# Patient Record
Sex: MALE | Race: WHITE | NOT HISPANIC OR LATINO | URBAN - METROPOLITAN AREA
[De-identification: names, ages, dates, MRNs, and addresses within clinical notes are randomized per-mention and may not be internally consistent; named-entity substitution may affect disease eponyms.]

---

## 2018-02-01 ENCOUNTER — EMERGENCY (EMERGENCY)
Facility: HOSPITAL | Age: 83
LOS: 1 days | Discharge: HOME | End: 2018-02-01
Attending: EMERGENCY MEDICINE | Admitting: EMERGENCY MEDICINE
Payer: MEDICARE

## 2018-02-01 VITALS
RESPIRATION RATE: 64 BRPM | DIASTOLIC BLOOD PRESSURE: 76 MMHG | SYSTOLIC BLOOD PRESSURE: 144 MMHG | TEMPERATURE: 98 F | WEIGHT: 115.08 LBS | HEART RATE: 64 BPM | OXYGEN SATURATION: 98 %

## 2018-02-01 DIAGNOSIS — M71.162 OTHER INFECTIVE BURSITIS, LEFT KNEE: ICD-10-CM

## 2018-02-01 DIAGNOSIS — Z79.899 OTHER LONG TERM (CURRENT) DRUG THERAPY: ICD-10-CM

## 2018-02-01 DIAGNOSIS — I10 ESSENTIAL (PRIMARY) HYPERTENSION: ICD-10-CM

## 2018-02-01 DIAGNOSIS — M25.562 PAIN IN LEFT KNEE: ICD-10-CM

## 2018-02-01 DIAGNOSIS — E78.5 HYPERLIPIDEMIA, UNSPECIFIED: ICD-10-CM

## 2018-02-01 DIAGNOSIS — Z79.82 LONG TERM (CURRENT) USE OF ASPIRIN: ICD-10-CM

## 2018-02-01 DIAGNOSIS — I25.10 ATHEROSCLEROTIC HEART DISEASE OF NATIVE CORONARY ARTERY WITHOUT ANGINA PECTORIS: ICD-10-CM

## 2018-02-01 PROCEDURE — 73562 X-RAY EXAM OF KNEE 3: CPT | Mod: 26

## 2018-02-01 PROCEDURE — 93971 EXTREMITY STUDY: CPT

## 2018-02-01 PROCEDURE — 99284 EMERGENCY DEPT VISIT MOD MDM: CPT | Mod: 25

## 2018-02-01 PROCEDURE — 93971 EXTREMITY STUDY: CPT | Mod: 26,LT

## 2018-02-01 PROCEDURE — 73562 X-RAY EXAM OF KNEE 3: CPT | Mod: 26,LT

## 2018-02-01 PROCEDURE — 73562 X-RAY EXAM OF KNEE 3: CPT

## 2018-02-01 RX ORDER — SIMVASTATIN 20 MG/1
0 TABLET, FILM COATED ORAL
Qty: 0 | Refills: 0 | COMMUNITY

## 2018-02-01 RX ORDER — ASPIRIN/CALCIUM CARB/MAGNESIUM 324 MG
1 TABLET ORAL
Qty: 0 | Refills: 0 | COMMUNITY

## 2018-02-01 RX ORDER — CARVEDILOL PHOSPHATE 80 MG/1
0 CAPSULE, EXTENDED RELEASE ORAL
Qty: 0 | Refills: 0 | COMMUNITY

## 2018-02-01 RX ORDER — DICLOXACILLIN SODIUM 500 MG/1
1 CAPSULE ORAL
Qty: 56 | Refills: 0 | OUTPATIENT
Start: 2018-02-01 | End: 2018-02-14

## 2018-02-01 RX ORDER — CLOPIDOGREL BISULFATE 75 MG/1
1 TABLET, FILM COATED ORAL
Qty: 0 | Refills: 0 | COMMUNITY

## 2018-02-01 RX ORDER — LISINOPRIL 2.5 MG/1
0 TABLET ORAL
Qty: 0 | Refills: 0 | COMMUNITY

## 2018-02-01 NOTE — ED PROVIDER NOTE - DIAGNOSTIC INTERPRETATION
ER Physician: Loraine Director  INTERPRETATION:  no acute fracture; no soft tissue swelling noted; normal bony alignment.

## 2018-02-01 NOTE — ED PROVIDER NOTE - OBJECTIVE STATEMENT
81 yo M w/ PMHx CAD s/p 2 stents (2002), HTN presents for worsening L knee swelling x 4 days.  Pt reports mechanical fall 9 days ago, landed on L knee.  No head trauma, no LOC.  For the first 5 days, pt had no symptoms and was able to ambulate without difficulty.  Since Monday, he has intermittent L knee pain and swelling, the swelling has progressed to the point where ambulation is now difficult.  Pt unable to fully bend knee.  No abrasion or cut over L knee associated w/ fall.  Pt called his orthopedist, Dr. Davis, and scheduled an appt for 2/6/18; however, his symptoms were bothersome and he decided to come to the ED today. Pt denies popping sensation in his knee; on ASA and plavix, denies use of blood thinners. ROS negative for fevers, chills, nausea, vomiting, cough, CP, abdominal pain, hematuria, melena, hematochezia, calf tenderness. 81 yo M w/ PMHx CAD s/p 2 stents (2002), HTN presents for worsening L knee swelling x 4 days.  Pt reports mechanical fall 9 days ago, landed on L knee.  No head trauma, no LOC.  For the first 5 days, pt had no symptoms and was able to ambulate without difficulty.  Since Monday, he has intermittent L knee pain and swelling, the swelling has progressed to the point where ambulation is now difficult.  Pt unable to fully bend knee.  No abrasion or cut over L knee associated w/ fall.  Pt called his orthopedist, Dr. Davis, and scheduled an appt for 2/6/18; however, his symptoms were bothersome and he decided to come to the ED today. Pt denies popping sensation in his knee; on ASA and plavix, denies use of blood thinners. ROS negative for bleeding gums, easy bruising, fevers, chills, nausea, vomiting, cough, CP, abdominal pain, hematuria, melena, hematochezia, calf tenderness.

## 2018-02-01 NOTE — ED ADULT NURSE NOTE - OBJECTIVE STATEMENT
Pt states that he tripped and fell a week ago and landed on L knee. Pt states that 3 days ago he noticed swelling and redness to L knee. Pt able to ambulate without assistance

## 2018-02-01 NOTE — ED PROVIDER NOTE - MEDICAL DECISION MAKING DETAILS
81 yo M w/ PMHx CAD s/p 2 stents (2002), HTN presents for worsening L knee swelling x 4 days after mechanical fall on L knee 9 days ago.  Low suspicion for septic joint as pt afebrile.  PE w/ erythema overlying knee likely 2/2 significant swelling.  No fluctuance, no warmth.  Will obtain XR to r/o fx. 81 yo M w/ PMHx CAD s/p 2 stents (2002), HTN presents for worsening L knee swelling x 4 days after mechanical fall on L knee 9 days ago.  Low suspicion for septic joint as pt afebrile.  PE w/ erythema overlying knee likely 2/2 significant swelling.  No fluctuance, no warmth.  XR negative for fracture.  US negative for DVT.  Pt asked to take colchicine x 1 (pt already has) for possible gout.  Will also give dicloxacillin 500 mg four times day x 14 days for suspected patellar bursitis.  Pt asked to f/u w/ existing outpt orthopedist tomorrow. Pt amenable to plan.

## 2018-02-01 NOTE — ED PROVIDER NOTE - ATTENDING CONTRIBUTION TO CARE
83 yo male h/o cad s/p 2 stents (2002), HTN, gout c/o knee pain/swelling x 4 d.  Pt fell 9 d ago w some pain at that time.  Pt noted new redness, swelling, pain w flexion but able to flex w/o fever.  Pt called his orthopedist, Dr. Davis, and scheduled an appt for 2/6/18.  No calf pain/swelling.  Well appearing, LLE - + erythema, warmth, swelling over patella, no jt line ttp, + from, no calf ttp, dp 1+.  Exam concerning for septic bursitis vs gout flare, no sig concern for septic jt.  Doppler neg for dvt, xray neg for fx.  Will try abx for bursitis and have pt start his indocin for possible gout, has appt w his orthopedist.  Instructed to return for increased pain, redness, fever, inability to bend, any other concerns.

## 2018-02-01 NOTE — ED PROVIDER NOTE - MUSCULOSKELETAL, MLM
Spine appears normal, range of motion is not limited.  Pt unable to flex knee past 45 degrees 2/2 pain and swelling.  Extension intact.  Point tenderness over anterior L knee, erythematous, not warm.

## 2018-02-01 NOTE — ED PROVIDER NOTE - SKIN, MLM
Skin normal color for race, warm, dry and intact. No evidence of rash. Skin normal color for race, warm, dry and intact. No evidence of rash. No petechiae.

## 2018-02-02 NOTE — CHART NOTE - NSCHARTNOTEFT_GEN_A_CORE
Pt called ED this morning, took 1 dicloxacillin yesterday and noticed mild swelling of lips, no associated dyspnea, wheezing, SOB, CP, rash.  Pt previously denied and again confirmed no previous drug allergy.  Reports taking penicillin and penicillin derivatives in the past without any reaction.  Pt reports breathing comfortably and does not endorse respiratory distress currently. Took 1 dose of Colcrys yesterday w/ rapid resolution of pain, improvement of swelling.  Now able to ambulate without difficulty.  Continues to deny fevers, warmth, and tenderness of L knee.  Pt asked to stop antibiotics.  Given strict return precautions.  Pt to f/u w/ orthopedist and internist on 2/5 and 2/6.

## 2021-10-20 NOTE — ED PROVIDER NOTE - PSYCHIATRIC, MLM
Alert and oriented to person, place, time/situation. normal mood and affect. no apparent risk to self or others.
URI (upper respiratory infection)

## 2024-08-20 ENCOUNTER — EMERGENCY (EMERGENCY)
Facility: HOSPITAL | Age: 89
LOS: 1 days | Discharge: HOME | End: 2024-08-20
Admitting: EMERGENCY MEDICINE
Payer: MEDICARE

## 2024-08-20 VITALS
RESPIRATION RATE: 18 BRPM | WEIGHT: 119.05 LBS | DIASTOLIC BLOOD PRESSURE: 89 MMHG | SYSTOLIC BLOOD PRESSURE: 165 MMHG | OXYGEN SATURATION: 99 % | TEMPERATURE: 98 F | HEART RATE: 72 BPM

## 2024-08-20 PROBLEM — E78.5 HYPERLIPIDEMIA, UNSPECIFIED: Chronic | Status: ACTIVE | Noted: 2018-02-01

## 2024-08-20 PROBLEM — I10 ESSENTIAL (PRIMARY) HYPERTENSION: Chronic | Status: ACTIVE | Noted: 2018-02-01

## 2024-08-20 PROCEDURE — 99282 EMERGENCY DEPT VISIT SF MDM: CPT

## 2024-08-20 PROCEDURE — 99283 EMERGENCY DEPT VISIT LOW MDM: CPT

## 2024-08-20 NOTE — ED PROVIDER NOTE - CONSTITUTIONAL [-], MLM
I have reviewed discharge instructions with the patient. The patient verbalized understanding. Patient left ED via Discharge Method: ambulatory to Home with (insert name of family/friend, self, transport). Opportunity for questions and clarification provided. Patient given 0 scripts. To continue your aftercare when you leave the hospital, you may receive an automated call from our care team to check in on how you are doing. This is a free service and part of our promise to provide the best care and service to meet your aftercare needs.  If you have questions, or wish to unsubscribe from this service please call [phone]. Thank you for Choosing our [facility] Emergency Department. no fever/no chills <<----- Click to add NO significant Past Surgical History

## 2024-08-20 NOTE — ED ADULT NURSE NOTE - NSFALLHARMRISKINTERV_ED_ALL_ED

## 2024-08-20 NOTE — ED PROVIDER NOTE - PATIENT PORTAL LINK FT
You can access the FollowMyHealth Patient Portal offered by [facility] by registering at the following website: [email or web address]. By joining [facility]’s FollowMyHealth portal, you will also be able to view your health information using other applications (apps) compatible with our system.

## 2024-08-20 NOTE — ED ADULT NURSE NOTE - OBJECTIVE STATEMENT
Pt is a 88y/o M denies PMHx presenting to the ED w/ c/o of dog bite to L-inner knee by dog on street, unsure of animal vaxx status. Upon assessment, pt w/ bruising  noted to L-inner knee, no active bleeding, pt denies pain. Pt denies previous rabies vaxx. Pt currently denies CP, SOB, fever/chills, abd pain, N/V/D, HA, blurry vision, dizziness/lightheadedness, numbness/tingling, nasal congestion, cough, sore throat, urinary symptoms, recent falls @ home. Pt A/Ox3, speaking in clear/complete sentences. Respirations easy/even and unlabored on RA. Pt ambulates independently w/ steady gait. Pt resting comfortably in chair, no acute distress. Pending ED provider eval.      \

## 2024-08-20 NOTE — ED PROVIDER NOTE - CLINICAL SUMMARY MEDICAL DECISION MAKING FREE TEXT BOX
89 y/o m presents c/o dog bite to left knee today.  Ext NVI, bite did not penetrate skin, area cleaned with chloroprep swab, no indication for rabies prophylaxis or abx.  Pt advised ice to area as there is small bruise, advised to return for any concerns, erythema, swelling, etc.

## 2024-08-20 NOTE — ED PROVIDER NOTE - OBJECTIVE STATEMENT
89 y/o m presents c/o being bit by a dog to left knee today.  Pt reports he was walking and bit by a dog passing by who was on a leash but didn't get any info from the owner.  Pt is wearing blue jeans, stating his skin is a little scraped but it doesn't look like the bite went through the skin.  Denies numbness/tingling to ext, all other ROS negative.

## 2024-08-20 NOTE — ED PROVIDER NOTE - SKIN, MLM
superficial abrasion to medial left knee, no puncture wound, small area of ecchymosis, no open wound, no bleeding independent

## 2024-08-22 DIAGNOSIS — S81.052A OPEN BITE, LEFT KNEE, INITIAL ENCOUNTER: ICD-10-CM
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
